# Patient Record
Sex: MALE | Race: WHITE | Employment: UNEMPLOYED | URBAN - METROPOLITAN AREA
[De-identification: names, ages, dates, MRNs, and addresses within clinical notes are randomized per-mention and may not be internally consistent; named-entity substitution may affect disease eponyms.]

---

## 2018-10-06 ENCOUNTER — APPOINTMENT (OUTPATIENT)
Dept: CT IMAGING | Facility: HOSPITAL | Age: 58
End: 2018-10-06
Attending: EMERGENCY MEDICINE

## 2018-10-06 PROCEDURE — 70450 CT HEAD/BRAIN W/O DYE: CPT | Performed by: EMERGENCY MEDICINE

## 2018-10-06 NOTE — ED NOTES
Patient pulled IV out stating he is ready to go. Patient currently sitting on edge of bed examining his arm and gown that is now drenched with blood-tinged saline.

## 2018-10-06 NOTE — ED INITIAL ASSESSMENT (HPI)
ETOH/ fall possible syncopal episode/unwitnessed. Patient having difficulty answering questions. Laceration to head.

## 2018-10-06 NOTE — ED NOTES
Patient uncooperative- refusing to get undressed and trying to climb off the cart. Security was called. attempted to redirect the patient with verbal patient continued to refuse. Patient was placed in 4 point locked restraints.

## 2018-10-06 NOTE — ED PROVIDER NOTES
Patient Seen in: [facility] Emergency Department    History   Patient presents with:  Alcohol Intoxication (neurologic)  Fall (musculoskeletal, neurologic)  Laceration Abrasion (integumentary)    Stated Complaint: etoh/fall/laceration    [facility] distress. HEENT: Extraocular muscles intact, pupils equal round reactive to light and accommodation. Mouth normal, neck supple, no meningismus. Abrasion to forehead centrally and right forehead above the hairline.   Patient has superficial laceration upp other components within normal limits   CBC WITH DIFFERENTIAL WITH PLATELET    Narrative: The following orders were created for panel order CBC WITH DIFFERENTIAL WITH PLATELET.   Procedure                               Abnormality         Status

## 2018-10-06 NOTE — ED NOTES
Patient up to restroom with no assist.  Bed linens changed, vital signs obtained. Patient declined breakfast.  No other needs at this time.

## (undated) NOTE — ED AVS SNAPSHOT
Sydney Tapia   MRN: [id number]    Department:  [facility] Emergency Department   Date of Visit:  10/6/2018           Disclosure     Insurance plans vary and the physician(s) referred by the ER may not be covered by your plan.  Please contact you tell this physician (or your personal doctor if your instructions are to return to your personal doctor) about any new or lasting problems. The primary care or specialist physician will see patients referred from the [facility] Emergency Department.  Amie Mao